# Patient Record
Sex: FEMALE | NOT HISPANIC OR LATINO
[De-identification: names, ages, dates, MRNs, and addresses within clinical notes are randomized per-mention and may not be internally consistent; named-entity substitution may affect disease eponyms.]

---

## 2017-02-23 PROBLEM — Z00.00 ENCOUNTER FOR PREVENTIVE HEALTH EXAMINATION: Status: ACTIVE | Noted: 2017-02-23

## 2017-09-28 ENCOUNTER — TRANSCRIPTION ENCOUNTER (OUTPATIENT)
Age: 32
End: 2017-09-28

## 2018-01-02 ENCOUNTER — TRANSCRIPTION ENCOUNTER (OUTPATIENT)
Age: 33
End: 2018-01-02

## 2018-01-09 ENCOUNTER — EMERGENCY (EMERGENCY)
Facility: HOSPITAL | Age: 33
LOS: 1 days | Discharge: HOME | End: 2018-01-09
Attending: EMERGENCY MEDICINE
Payer: COMMERCIAL

## 2018-01-09 VITALS
OXYGEN SATURATION: 99 % | RESPIRATION RATE: 20 BRPM | SYSTOLIC BLOOD PRESSURE: 100 MMHG | DIASTOLIC BLOOD PRESSURE: 77 MMHG | HEART RATE: 60 BPM

## 2018-01-09 VITALS
RESPIRATION RATE: 16 BRPM | HEART RATE: 83 BPM | SYSTOLIC BLOOD PRESSURE: 119 MMHG | OXYGEN SATURATION: 98 % | DIASTOLIC BLOOD PRESSURE: 79 MMHG | TEMPERATURE: 98 F

## 2018-01-09 LAB
APPEARANCE UR: CLEAR — SIGNIFICANT CHANGE UP
BILIRUB UR-MCNC: NEGATIVE — SIGNIFICANT CHANGE UP
COLOR SPEC: YELLOW — SIGNIFICANT CHANGE UP
DIFF PNL FLD: ABNORMAL
GLUCOSE UR QL: NEGATIVE — SIGNIFICANT CHANGE UP
HCG UR QL: NEGATIVE — SIGNIFICANT CHANGE UP
KETONES UR-MCNC: NEGATIVE — SIGNIFICANT CHANGE UP
LEUKOCYTE ESTERASE UR-ACNC: ABNORMAL
NITRITE UR-MCNC: NEGATIVE — SIGNIFICANT CHANGE UP
PH UR: 8 — SIGNIFICANT CHANGE UP (ref 5–8)
PROT UR-MCNC: NEGATIVE — SIGNIFICANT CHANGE UP
SP GR SPEC: 1.02 — SIGNIFICANT CHANGE UP (ref 1.01–1.02)
UROBILINOGEN FLD QL: NEGATIVE — SIGNIFICANT CHANGE UP

## 2018-01-09 PROCEDURE — 99284 EMERGENCY DEPT VISIT MOD MDM: CPT

## 2018-01-09 PROCEDURE — 76856 US EXAM PELVIC COMPLETE: CPT

## 2018-01-09 PROCEDURE — 76830 TRANSVAGINAL US NON-OB: CPT | Mod: 26

## 2018-01-09 PROCEDURE — 76856 US EXAM PELVIC COMPLETE: CPT | Mod: 26

## 2018-01-09 PROCEDURE — 99284 EMERGENCY DEPT VISIT MOD MDM: CPT | Mod: 25

## 2018-01-09 PROCEDURE — 81025 URINE PREGNANCY TEST: CPT

## 2018-01-09 PROCEDURE — 96372 THER/PROPH/DIAG INJ SC/IM: CPT

## 2018-01-09 PROCEDURE — 81001 URINALYSIS AUTO W/SCOPE: CPT

## 2018-01-09 PROCEDURE — 76830 TRANSVAGINAL US NON-OB: CPT

## 2018-01-09 RX ORDER — CEFTRIAXONE 500 MG/1
250 INJECTION, POWDER, FOR SOLUTION INTRAMUSCULAR; INTRAVENOUS ONCE
Qty: 0 | Refills: 0 | Status: COMPLETED | OUTPATIENT
Start: 2018-01-09 | End: 2018-01-09

## 2018-01-09 RX ADMIN — Medication 100 MILLIGRAM(S): at 18:12

## 2018-01-09 RX ADMIN — CEFTRIAXONE 250 MILLIGRAM(S): 500 INJECTION, POWDER, FOR SOLUTION INTRAMUSCULAR; INTRAVENOUS at 18:12

## 2018-01-09 NOTE — ED PROVIDER NOTE - MEDICAL DECISION MAKING DETAILS
33 y/o F pt with diffuse pelvic pain x2 weeks, worsening. Will check urine pregnancy, UA and pelvic US. 33 y/o F pt with diffuse pelvic pain x2 weeks, worsening. UCG (-). U/A with trace blood. Pelvic exam reveals cervical motion tenderness. Will send Urine for GC/Chlamydia. Tx with rocephin/doxy. Pelvic U/S WNL. Instructed pt to f/u with G[facility]

## 2018-01-09 NOTE — ED PROVIDER NOTE - OBJECTIVE STATEMENT
33 y/o F pt with no significant PMHx and no significant PSHx presents to the ED c/o intermittent, shooting, cramping lower abd pain x2.5 weeks. Pt states that x1 week ago, she went to urgent care, where she received a UA with positive WBC; pt was d/c with Nitrofurantoin. Pt presents today 2/2 worsening constant pain since yesterday. Pt states she did IVF twice last year, with no success and notes that she is currently trying to have a baby. Pt has not done any recent checkups regarding her fertility status. Pt tried to make an appointment with her gyn, but has not been able to. Pt reports a painful period x last month, that only lasted 2-3 days. Pt is stating that her current symptoms have been intermittent since her last period. Pt's symptoms are aggravating with urination. Pt also notes that she sometimes sees blood in her stool. Pt is currently sexually active with only her . Pt reports mild nausea, but denies vomiting. Pt also denies diarrhea, fever, chills, vaginal bleeding, vaginal discharge or any other complaints. LMP: 12/20/2017. 33 y/o F pt with no significant PMHx and no significant PSHx presents to the ED c/o intermittent, shooting, cramping lower abd pain x2.5 weeks. Pt states that x1 week ago, she went to urgent care, where she received a UA with positive WBC; pt was d/c with Nitrofurantoin. Pt presents today 2/2 worsening constant pain since yesterday. Pt states she did IVF twice last year, with no success and notes that she is currently trying to have a baby. Pt has not done any recent checkups regarding her fertility status. Pt tried to make an appointment with her gyn, but able to make appt on 1/26/18. Pt reports a painful period x last month, that only lasted 2-3 days. Pt is stating that her current symptoms have been intermittent since her last period. Pt's symptoms are aggravated with urination. Pt is currently sexually active with only her . Pt reports mild nausea, but denies vomiting. Pt also denies diarrhea, fever, chills, vaginal bleeding, vaginal discharge or any other complaints. LMP: 12/20/2017.

## 2018-01-09 NOTE — ED ADULT NURSE NOTE - OBJECTIVE STATEMENT
AOX3 +ambulatory patient reports lower abdominal pain x 2 weeks. Patient stated she went to an urgent care and was given abx for 7 days for UTI. AOX3 +ambulatory patient reports lower abdominal pain x 2 weeks. Patient stated she went to an urgent care and was given abx for 7 days for UTI. Patient denies any fevers, chills, nausea, vomiting, or burning in urination.

## 2018-01-10 LAB
C TRACH RRNA SPEC QL NAA+PROBE: SIGNIFICANT CHANGE UP
N GONORRHOEA RRNA SPEC QL NAA+PROBE: SIGNIFICANT CHANGE UP
SPECIMEN SOURCE: SIGNIFICANT CHANGE UP

## 2018-01-11 ENCOUNTER — RESULT REVIEW (OUTPATIENT)
Age: 33
End: 2018-01-11

## 2018-01-12 ENCOUNTER — APPOINTMENT (OUTPATIENT)
Dept: OBGYN | Facility: CLINIC | Age: 33
End: 2018-01-12
Payer: COMMERCIAL

## 2018-01-12 ENCOUNTER — TRANSCRIPTION ENCOUNTER (OUTPATIENT)
Age: 33
End: 2018-01-12

## 2018-01-12 PROCEDURE — 99395 PREV VISIT EST AGE 18-39: CPT

## 2018-01-26 ENCOUNTER — APPOINTMENT (OUTPATIENT)
Dept: OBGYN | Facility: CLINIC | Age: 33
End: 2018-01-26

## 2018-02-12 ENCOUNTER — APPOINTMENT (OUTPATIENT)
Dept: ULTRASOUND IMAGING | Facility: CLINIC | Age: 33
End: 2018-02-12

## 2018-02-12 ENCOUNTER — OUTPATIENT (OUTPATIENT)
Dept: OUTPATIENT SERVICES | Facility: HOSPITAL | Age: 33
LOS: 1 days | End: 2018-02-12
Payer: COMMERCIAL

## 2018-02-12 DIAGNOSIS — N94.9 UNSPECIFIED CONDITION ASSOCIATED WITH FEMALE GENITAL ORGANS AND MENSTRUAL CYCLE: ICD-10-CM

## 2018-02-12 PROCEDURE — 76830 TRANSVAGINAL US NON-OB: CPT | Mod: 26

## 2018-02-12 PROCEDURE — 76830 TRANSVAGINAL US NON-OB: CPT

## 2018-02-12 PROCEDURE — 76856 US EXAM PELVIC COMPLETE: CPT | Mod: 26

## 2018-02-12 PROCEDURE — 76856 US EXAM PELVIC COMPLETE: CPT

## 2018-02-14 ENCOUNTER — APPOINTMENT (OUTPATIENT)
Dept: OBGYN | Facility: CLINIC | Age: 33
End: 2018-02-14
Payer: COMMERCIAL

## 2018-02-14 PROCEDURE — 99214 OFFICE O/P EST MOD 30 MIN: CPT

## 2018-02-14 PROCEDURE — 81025 URINE PREGNANCY TEST: CPT

## 2021-02-04 ENCOUNTER — TRANSCRIPTION ENCOUNTER (OUTPATIENT)
Age: 36
End: 2021-02-04

## 2021-02-28 ENCOUNTER — TRANSCRIPTION ENCOUNTER (OUTPATIENT)
Age: 36
End: 2021-02-28

## 2021-09-09 ENCOUNTER — TRANSCRIPTION ENCOUNTER (OUTPATIENT)
Age: 36
End: 2021-09-09

## 2022-01-26 ENCOUNTER — TRANSCRIPTION ENCOUNTER (OUTPATIENT)
Age: 37
End: 2022-01-26

## 2022-02-08 ENCOUNTER — TRANSCRIPTION ENCOUNTER (OUTPATIENT)
Age: 37
End: 2022-02-08

## 2022-03-17 ENCOUNTER — TRANSCRIPTION ENCOUNTER (OUTPATIENT)
Age: 37
End: 2022-03-17

## 2022-09-01 ENCOUNTER — NON-APPOINTMENT (OUTPATIENT)
Age: 37
End: 2022-09-01

## 2023-03-23 NOTE — ED PROVIDER NOTE - ATTESTATION, MLM
[Negative] : Heme/Lymph
I have reviewed and confirmed nurses' notes for patient's medications, allergies, medical history, and surgical history.

## 2023-09-05 ENCOUNTER — NON-APPOINTMENT (OUTPATIENT)
Age: 38
End: 2023-09-05

## 2024-04-23 ENCOUNTER — NON-APPOINTMENT (OUTPATIENT)
Age: 39
End: 2024-04-23

## 2025-02-24 ENCOUNTER — RX RENEWAL (OUTPATIENT)
Age: 40
End: 2025-02-24